# Patient Record
Sex: FEMALE | Race: BLACK OR AFRICAN AMERICAN
[De-identification: names, ages, dates, MRNs, and addresses within clinical notes are randomized per-mention and may not be internally consistent; named-entity substitution may affect disease eponyms.]

---

## 2017-04-24 ENCOUNTER — HOSPITAL ENCOUNTER (EMERGENCY)
Dept: HOSPITAL 41 - JD.ED | Age: 27
Discharge: HOME | End: 2017-04-24
Payer: SELF-PAY

## 2017-04-24 VITALS — SYSTOLIC BLOOD PRESSURE: 137 MMHG | DIASTOLIC BLOOD PRESSURE: 83 MMHG
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
## 2017-04-24 DIAGNOSIS — F10.239: ICD-10-CM

## 2017-04-24 DIAGNOSIS — R56.9: Primary | ICD-10-CM

## 2017-04-24 PROCEDURE — 82009 KETONE BODYS QUAL: CPT

## 2017-04-24 PROCEDURE — 85025 COMPLETE CBC W/AUTO DIFF WBC: CPT

## 2017-04-24 PROCEDURE — 84703 CHORIONIC GONADOTROPIN ASSAY: CPT

## 2017-04-24 PROCEDURE — 84443 ASSAY THYROID STIM HORMONE: CPT

## 2017-04-24 PROCEDURE — G0480 DRUG TEST DEF 1-7 CLASSES: HCPCS

## 2017-04-24 PROCEDURE — 80053 COMPREHEN METABOLIC PANEL: CPT

## 2017-04-24 PROCEDURE — 81001 URINALYSIS AUTO W/SCOPE: CPT

## 2017-04-24 PROCEDURE — 99285 EMERGENCY DEPT VISIT HI MDM: CPT

## 2017-04-24 PROCEDURE — 96361 HYDRATE IV INFUSION ADD-ON: CPT

## 2017-04-24 PROCEDURE — 36415 COLL VENOUS BLD VENIPUNCTURE: CPT

## 2017-04-24 PROCEDURE — 80306 DRUG TEST PRSMV INSTRMNT: CPT

## 2017-04-24 PROCEDURE — 83735 ASSAY OF MAGNESIUM: CPT

## 2017-04-24 PROCEDURE — 96374 THER/PROPH/DIAG INJ IV PUSH: CPT

## 2017-04-24 NOTE — EDM.PDOC
ED HPI SEIZURE COMPLAINT





- General


Chief Complaint: Neurological Problem


Stated Complaint: [facility]


Time Seen by Provider: 17 11:13


Source of Information: Reports: Patient, EMS


History Limitations: Reports: Other (post ictal)





- History of Present Illness


INITIAL COMMENTS - FREE TEXT/NARRATIVE: 





Patient presents for evaluation and treatment of a seizure. Patient was at work 

when the seizure occurred. She is a  at [employer]. This 

was a witnessed seizure. Per the other workers reports the seizure was 5 

minutes. EMS responded to the call and was there within 3 minutes. Upon EMS 

arrival she was no longer seizing. Patient states she has no history of seizures

, however, EMS did recognize her and has picked her up after a seizure before. 

She is currently orientated to person and place but does not recall the day 

month or year. She does not recall having a seizure. She states last thing she 

remembers was working at [employer]. Per her coworkers she had a generalized 

tonic clonic seizure. She's not on any medications. Has no underlying medical 

conditions. She denies any urinary incontinence, tongue biting, headaches, 

vision changes, lightheadedness or dizziness.





Denies any drug or alcohol abuse. 


Event Occurred (Where): work


Event (Witnessed/Unwitnessed): witnessed


Quality: Reports: generalized shaking


Event Symptoms: Reports: confusion.  Denies: incontinence, tongue biting, 

headaches


Post Event Symptoms: Reports: confused.  Denies: headache





- Related Data


Allergies/ADRs: 


 Allergies











Allergy/AdvReac Type Severity Reaction Status Date / Time


 


No Known Allergies Allergy   Verified 17 11:16











Home Meds: 


 Home Meds





. [No Known Home Meds]  17 [History]











Past Medical History





- Past Health History


Medical/Surgical History: Denies Medical/Surgical History





Social & Family History





- Family History


Family Medical History: Noncontributory





- Tobacco Use


Smoking Status *Q: Never Smoker


Second Hand Smoke Exposure: No





- Recreational Drug Use


Recreational Drug Use: No





ED ROS GENERAL





- Review of Systems


Review Of Systems: See Below


Constitutional: Denies: fever


HEENT: Denies: Vision change


GI/Abdominal: Denies: Nausea, Vomiting


Neurological: Reports: Seizure.  Denies: Headache, Numbness, Tingling





- Physical Exam


Exam: See Below


Exam Limited By: No limitations


General Appearance: alert, WD/WN, no apparent distress


Eye Exam: bilateral eye: PERRL


Ears: normal external exam, normal canal, hearing grossly normal, normal TMs


Nose: normal inspection


Throat/Mouth: Normal inspection, Normal lips, Normal voice, No airway 

compromise.  No: Evidence of tongue biting


Neck: normal inspection, full range of motion


Respiratory/Chest: no respiratory distress, lungs clear, normal breath sounds


Cardiovascular: normal peripheral pulses, regular rate, rhythm, no murmur


GI/Abdominal: normal bowel sounds, soft, non tender


Neuro Exam (Abbreviated): alert, oriented (orientated to person,  and place 

but not day, month or year), confused (orientated  to person, date of brith, 

place but not time)


Psychiatric: normal affect, normal mood


Skin Exam: Warm, Dry, Normal color





Course





- Vital Signs


Last Recorded V/S: 


 Last Vital Signs











Temp  36.4 C   17 11:13


 


Pulse  114 H  17 16:20


 


Resp  25 H  17 16:20


 


BP  137/83   17 16:20


 


Pulse Ox  100   17 16:20














- Orders/Labs/Meds


Labs: 


 Laboratory Tests











  17 Range/Units





  11:35 11:35 11:35 


 


WBC  3.92 L    (3.98-10.04)  K/mm3


 


RBC  3.54 L    (3.98-5.22)  M/mm3


 


Hgb  10.8 L    (11.2-15.7)  gm/L


 


Hct  33.2 L    (34.1-44.9)  %


 


MCV  93.8    (79.4-94.8)  fl


 


MCH  30.5    (25.6-32.2)  pg


 


MCHC  32.5    (32.2-35.5)  g/dl


 


RDW Std Deviation  57.2 H    (36.4-46.3)  fL


 


Plt Count  315    (182-369)  K/mm3


 


MPV  9.0 L    (9.4-12.3)  fl


 


Neut % (Auto)  74.9 H    (34.0-71.1)  %


 


Lymph % (Auto)  16.6 L    (19.3-51.7)  %


 


Mono % (Auto)  6.9    (4.7-12.5)  %


 


Eos % (Auto)  0 L    (0.7-5.8)  


 


Baso % (Auto)  1.3 H    (0.1-1.2)  %


 


Neut # (Auto)  2.94    (1.56-6.13)  K/mm3


 


Lymph # (Auto)  0.65 L    (1.18-3.74)  K/mm3


 


Mono # (Auto)  0.27    (0.24-0.36)  K/mm3


 


Eos # (Auto)  0.00 L    (0.04-0.36)  K/mm3


 


Baso # (Auto)  0.05    (0.01-0.08)  K/mm3


 


Sodium   139   (136-145)  mEq/L


 


Potassium   3.9   (3.5-5.1)  mEq/L


 


Chloride   101   ()  mEq/L


 


Carbon Dioxide   21   (21-32)  mEq/L


 


Anion Gap   20.9 H   (5-15)  


 


BUN   11   (7-18)  mg/dL


 


Creatinine   0.6   (0.55-1.02)  mg/dL


 


Est Cr Clr Drug Dosing   TNP   


 


Estimated GFR (MDRD)   > 60   (>60)  mL/min


 


BUN/Creatinine Ratio   18.3 H   (14-18)  


 


Glucose   138 H   ()  mg/dL


 


Calcium   9.1   (8.5-10.1)  mg/dL


 


Magnesium   1.6 L   (1.8-2.4)  mg/dl


 


Total Bilirubin   0.7   (0.2-1.0)  mg/dL


 


AST   192 H   (15-37)  U/L


 


ALT   100 H   (14-59)  U/L


 


Alkaline Phosphatase   129 H   ()  U/L


 


Total Protein   7.7   (6.4-8.2)  g/dl


 


Albumin   3.7   (3.4-5.0)  g/dl


 


Globulin   4.0   gm/dL


 


Albumin/Globulin Ratio   0.9 L   (1-2)  


 


TSH 3rd Generation   2.805   (0.358-3.74)  uIU/mL


 


HCG, Qual     (NEGATIVE)  


 


Urine Color     (Yellow)  


 


Urine Appearance     (Clear)  


 


Urine pH     (5.0-8.0)  


 


Ur Specific Gravity     (1.005-1.030)  


 


Urine Protein     (Negative)  


 


Urine Glucose (UA)     (Negative)  


 


Urine Ketones     (Negative)  


 


Urine Occult Blood     (Negative)  


 


Urine Nitrite     (Negative)  


 


Urine Bilirubin     (Negative)  


 


Urine Urobilinogen     (0.2-1.0)  


 


Ur Leukocyte Esterase     (Negative)  


 


Urine RBC     (0-5)  /hpf


 


Urine WBC     (0-5)  /hpf


 


Ur Epithelial Cells     (0-5)  /hpf


 


Urine Bacteria     (FEW)  /hpf


 


Hyaline Casts     (0-5)  /lpf


 


Urine Mucus     (FEW)  /hpf


 


Urine Opiates Screen     (NEGATIVE)  


 


Ur Buprenorphine Scrn     (NEGATIVE)  


 


Ur Oxycodone Screen     (NEGATIVE)  


 


Urine Methadone Screen     (NEGATIVE)  


 


Ur Propoxyphene Screen     (NEGATIVE)  


 


Ur Barbiturates Screen     (NEGATIVE)  


 


Ur Tricyclics Screen     (NEGATIVE)  


 


Ur Phencyclidine Scrn     (NEGATIVE)  


 


Ur Amphetamine Screen     (NEGATIVE)  


 


U Methamphetamines Scrn     (NEGATIVE)  


 


U Benzodiazepines Scrn     (NEGATIVE)  


 


U Cocaine Metab Screen     (NEGATIVE)  


 


U Marijuana (THC) Screen     (NEGATIVE)  


 


Ethyl Alcohol   0.01   (0.00)  gm%


 


Ketones    0.47  (0.0-0.3)  mM














  17 Range/Units





  11:35 14:45 14:45 


 


WBC     (3.98-10.04)  K/mm3


 


RBC     (3.98-5.22)  M/mm3


 


Hgb     (11.2-15.7)  gm/L


 


Hct     (34.1-44.9)  %


 


MCV     (79.4-94.8)  fl


 


MCH     (25.6-32.2)  pg


 


MCHC     (32.2-35.5)  g/dl


 


RDW Std Deviation     (36.4-46.3)  fL


 


Plt Count     (182-369)  K/mm3


 


MPV     (9.4-12.3)  fl


 


Neut % (Auto)     (34.0-71.1)  %


 


Lymph % (Auto)     (19.3-51.7)  %


 


Mono % (Auto)     (4.7-12.5)  %


 


Eos % (Auto)     (0.7-5.8)  


 


Baso % (Auto)     (0.1-1.2)  %


 


Neut # (Auto)     (1.56-6.13)  K/mm3


 


Lymph # (Auto)     (1.18-3.74)  K/mm3


 


Mono # (Auto)     (0.24-0.36)  K/mm3


 


Eos # (Auto)     (0.04-0.36)  K/mm3


 


Baso # (Auto)     (0.01-0.08)  K/mm3


 


Sodium     (136-145)  mEq/L


 


Potassium     (3.5-5.1)  mEq/L


 


Chloride     ()  mEq/L


 


Carbon Dioxide     (21-32)  mEq/L


 


Anion Gap     (5-15)  


 


BUN     (7-18)  mg/dL


 


Creatinine     (0.55-1.02)  mg/dL


 


Est Cr Clr Drug Dosing     


 


Estimated GFR (MDRD)     (>60)  mL/min


 


BUN/Creatinine Ratio     (14-18)  


 


Glucose     ()  mg/dL


 


Calcium     (8.5-10.1)  mg/dL


 


Magnesium     (1.8-2.4)  mg/dl


 


Total Bilirubin     (0.2-1.0)  mg/dL


 


AST     (15-37)  U/L


 


ALT     (14-59)  U/L


 


Alkaline Phosphatase     ()  U/L


 


Total Protein     (6.4-8.2)  g/dl


 


Albumin     (3.4-5.0)  g/dl


 


Globulin     gm/dL


 


Albumin/Globulin Ratio     (1-2)  


 


TSH 3rd Generation     (0.358-3.74)  uIU/mL


 


HCG, Qual  Negative    (NEGATIVE)  


 


Urine Color    Yellow  (Yellow)  


 


Urine Appearance    Slt cloudy H  (Clear)  


 


Urine pH    7.0  (5.0-8.0)  


 


Ur Specific Gravity    1.025  (1.005-1.030)  


 


Urine Protein    1+ H  (Negative)  


 


Urine Glucose (UA)    Negative  (Negative)  


 


Urine Ketones    2+ H  (Negative)  


 


Urine Occult Blood    Negative  (Negative)  


 


Urine Nitrite    Negative  (Negative)  


 


Urine Bilirubin    Negative  (Negative)  


 


Urine Urobilinogen    1.0  (0.2-1.0)  


 


Ur Leukocyte Esterase    Negative  (Negative)  


 


Urine RBC    0-5  (0-5)  /hpf


 


Urine WBC    0-5  (0-5)  /hpf


 


Ur Epithelial Cells    0-5  (0-5)  /hpf


 


Urine Bacteria    Rare  (FEW)  /hpf


 


Hyaline Casts    0-5  (0-5)  /lpf


 


Urine Mucus    Few  (FEW)  /hpf


 


Urine Opiates Screen   Negative   (NEGATIVE)  


 


Ur Buprenorphine Scrn   Negative   (NEGATIVE)  


 


Ur Oxycodone Screen   Negative   (NEGATIVE)  


 


Urine Methadone Screen   Negative   (NEGATIVE)  


 


Ur Propoxyphene Screen   Negative   (NEGATIVE)  


 


Ur Barbiturates Screen   Negative   (NEGATIVE)  


 


Ur Tricyclics Screen   Negative   (NEGATIVE)  


 


Ur Phencyclidine Scrn   Negative   (NEGATIVE)  


 


Ur Amphetamine Screen   Negative   (NEGATIVE)  


 


U Methamphetamines Scrn   Negative   (NEGATIVE)  


 


U Benzodiazepines Scrn   Negative   (NEGATIVE)  


 


U Cocaine Metab Screen   Negative   (NEGATIVE)  


 


U Marijuana (THC) Screen   Negative   (NEGATIVE)  


 


Ethyl Alcohol     (0.00)  gm%


 


Ketones     (0.0-0.3)  mM











Meds: 


Medications














Discontinued Medications














Generic Name Dose Route Start Last Admin





  Trade Name Melisa  PRN Reason Stop Dose Admin


 


Sodium Chloride  1,000 mls @ 999 mls/hr  17 11:20  17 11:40





  Normal Saline  IV  17 12:20  999 mls/hr





  ONETIME ONE   Administration


 


Sodium Chloride  500 mls @ 999 mls/hr  17 14:03  17 14:05





  Normal Saline  IV  17 14:33  999 mls/hr





  ONETIME ONE   Administration


 


Sodium Chloride  Confirm  17 14:05  17 14:08





  Normal Saline  Administered  17 14:06  Not Given





  Dose   





  1,000 mls @ as directed   





  .ROUTE   





  .STK-MED ONE   


 


Lorazepam  1 mg  17 11:20  17 11:38





  Ativan  IVPUSH  17 11:21  1 mg





  ONETIME ONE   Administration


 


Sodium Chloride  10 ml  17 11:16  17 11:35





  Saline Flush  FLUSH   10 ml





  ASDIRECTED PRN   Administration





  Keep Vein Open   














- Re-Assessments/Exams


Free Text/Narrative Re-Assessment/Exam: 





17 13:21


Patient is currently resting comfortably. She is hungry. I will have the 

nursing staff get her something to eat. I asked her about alcohol use. She 

states that she did drink on Friday and Saturday. She states that they "went 

crazy ". She denies anything to drink on . She states that she drinks 

socially on occasion. She is not a heavy drinker. She is still denying any pain 

or headaches.





Review of the patient's records show that she was in the ER in  

after having a seizure. A CT of the head was done at that time. Therefore I 

will defer CT as she had one recently. She has a small bump to her for her but 

no signs of any head bleed. She continues to deny any headaches. Alcohol was 

slightly elevated at her 2016 visit. Record also shows an admission to 

the ICU in 2014 with alcohol of 0.51.





17 16:00


Patient's labs have returned.


White blood cell count is 3.92, hemoglobin of 10.8 and platelets are 3:15.


HCG is negative.


Sodium is 139, potassium 3.9 and chloride is 101. Anion gap is 20.9. Glucose is 

138.


mag is slightly low 1.6.


AST is 192, ALT is 100 alkaline phosphatase is 129.


Alcohol is 0.01.


TSH is 2.805.


Ketones are 0.85.





Discussed case with Dr. Curiel. Recommended starting keppra as this is the 

second seizure she has had.





Discussed starting keppra with the patient. She is not interested in starting 

keppra. I feel that this is likely an alcohol withdrawal seizure. I advised 

that she should not drink alcohol. I feel that she would benefit from starting 

Keppra as I am unsure if she will stop drinking alcohol. She is not interested 

in starting Keppra.





Patient does not have insurance and she is concerned about cost. I will have 

her contact Candice at the hospital to discussed payments and insurance needs.





Will discharge home. Discharge instructions as documented. 








Departure





- Departure


Time of Disposition: 16:00


Disposition: Home, Self-Care 01


Condition: fair


Clinical Impression: 


 Seizure, Alcohol withdrawal





Instructions:  Alcohol Use Disorder, Seizure, Adult


Referrals: 


PCP,None [Primary Care Provider] - 


Pam Garcia PA-C [Physician Assistant] - 


Forms:  ED Department Discharge


Additional Instructions: 


Go home and rest. 





Make sure you are drinking plenty of fluids today. 





Recommend that you do not drive x 3 months due to recent seizure. 





Follow-up with family med or internal med in 1-2 weeks for a recheck.





Avoid alcohol. 





For insurance and financial help I recommend you contact Candice Warren at [phone]. 





Please return to the ER should your symptoms change or worsen.

## 2020-03-09 ENCOUNTER — HOSPITAL ENCOUNTER (EMERGENCY)
Dept: HOSPITAL 41 - JD.ED | Age: 30
Discharge: HOME | End: 2020-03-09
Payer: SELF-PAY

## 2020-03-09 VITALS — SYSTOLIC BLOOD PRESSURE: 143 MMHG | HEART RATE: 94 BPM | DIASTOLIC BLOOD PRESSURE: 87 MMHG

## 2020-03-09 DIAGNOSIS — G40.409: Primary | ICD-10-CM

## 2020-03-09 NOTE — CT
Head CT

 

Technique: Multiple axial sections through the brain were obtained.  

Intravenous contrast was not utilized.

 

Comparison: Prior head CT study of 10/11/14.

 

Findings: Ventricles along with basal cisterns and sulci over the 

convexities are within normal limits for the patient's age.  No 

abnormal parenchymal densities are seen.  No evidence of intracranial 

hemorrhage.  No midline shift or mass-effect is seen.  

 

Bone window settings were reviewed show mastoid sinuses to appear 

clear.  Opacified left maxillary sinus is seen as well as mild mucosal

 thickening within the left ethmoid sinus. No acute calvarial 

abnormality is appreciated.

 

Impression:

1.  Opacified left maxillary sinus and mucosal thickening within the 

left ethmoid sinus.  Uncertain if findings represent acute or chronic 

sinusitis.

2.  No acute intracranial abnormality is identified.

 

Diagnostic code #3

 

This report was dictated in [place] Standard Time

## 2020-06-17 ENCOUNTER — HOSPITAL ENCOUNTER (EMERGENCY)
Dept: HOSPITAL 41 - JD.ED | Age: 30
Discharge: HOME | End: 2020-06-17
Payer: SELF-PAY

## 2020-06-17 VITALS — SYSTOLIC BLOOD PRESSURE: 132 MMHG | HEART RATE: 102 BPM | DIASTOLIC BLOOD PRESSURE: 91 MMHG

## 2020-06-17 DIAGNOSIS — R56.9: Primary | ICD-10-CM

## 2020-06-17 DIAGNOSIS — F10.230: ICD-10-CM

## 2020-06-17 PROCEDURE — 96361 HYDRATE IV INFUSION ADD-ON: CPT

## 2020-06-17 PROCEDURE — 80053 COMPREHEN METABOLIC PANEL: CPT

## 2020-06-17 PROCEDURE — 84703 CHORIONIC GONADOTROPIN ASSAY: CPT

## 2020-06-17 PROCEDURE — 99285 EMERGENCY DEPT VISIT HI MDM: CPT

## 2020-06-17 PROCEDURE — 80307 DRUG TEST PRSMV CHEM ANLYZR: CPT

## 2020-06-17 PROCEDURE — 70450 CT HEAD/BRAIN W/O DYE: CPT

## 2020-06-17 PROCEDURE — 96374 THER/PROPH/DIAG INJ IV PUSH: CPT

## 2020-06-17 PROCEDURE — 83735 ASSAY OF MAGNESIUM: CPT

## 2020-06-17 PROCEDURE — 36415 COLL VENOUS BLD VENIPUNCTURE: CPT

## 2020-06-17 PROCEDURE — 85025 COMPLETE CBC W/AUTO DIFF WBC: CPT

## 2020-06-17 NOTE — EDM.PDOC
ED HPI GENERAL MEDICAL PROBLEM





- General


Chief Complaint: Neuro Symptoms/Deficits


Stated Complaint: [facility]


Time Seen by Provider: 06/17/20 11:00


Source of Information: Reports: Patient, EMS


History Limitations: Reports: Altered Mental Status





- History of Present Illness


INITIAL COMMENTS - FREE TEXT/NARRATIVE: 





The patient presents by [facility] for a seizure.  The patient was at 

the [facility] and had a generalized tonic clonic seizure.  She did hit her head when 

she fell.  She is still a little confused to where she is and the date.  She has

no headache, fever, chills, cough, chest pain, shortness of breath, abdominal 

pain, nausea or vomiting.  She denies that this has happened before but her ER 

records indicate she has been here a few times for the same thing.  She was put 

on keppra at one time but she did not continue to take it.  She also one time 

was intoxicated and needed to be admitted.  She says she occasionally drinks now

and did not drink recently.


Onset: Sudden


Duration: Minutes:


Location: Reports: Generalized


Severity: Severe


Improves with: Reports: None


Worsens with: Reports: None


Associated Symptoms: Reports: No Other Symptoms





- Related Data


                                    Allergies











Allergy/AdvReac Type Severity Reaction Status Date / Time


 


No Known Allergies Allergy   Verified 06/17/20 11:01











Home Meds: 


                                    Home Meds





LORazepam [Ativan] 1 mg PO DAILY #18 tablet 06/17/20 [Rx]











Past Medical History





- Past Health History


Medical/Surgical History: Denies Medical/Surgical History





Social & Family History





- Family History


Family Medical History: Noncontributory





- Tobacco Use


Smoking Status *Q: Never Smoker


Second Hand Smoke Exposure: No





- Caffeine Use


Caffeine Use: Reports: None





- Recreational Drug Use


Recreational Drug Use: No





ED ROS GENERAL





- Review of Systems


Review Of Systems: See Below


Constitutional: Reports: No Symptoms


HEENT: Reports: No Symptoms


Respiratory: Reports: No Symptoms


Cardiovascular: Reports: No Symptoms


Endocrine: Reports: No Symptoms


GI/Abdominal: Reports: No Symptoms


: Reports: No Symptoms


Musculoskeletal: Reports: No Symptoms


Neurological: Reports: Seizure





ED EXAM, NEURO





- Physical Exam


Exam: See Below


Exam Limited By: No Limitations


General Appearance: Alert, No Apparent Distress


Ears: Normal External Exam


Nose: Normal Inspection


Head Exam: Atraumatic, Normocephalic


Neck: Normal Inspection


Respiratory/Chest: No Respiratory Distress, Lungs Clear, Normal Breath Sounds


Cardiovascular: Regular Rate, Rhythm, No Edema, No Murmur


GI/Abdominal: Soft, Non-Tender, No Organomegaly, No Mass


Neurological: Alert, No Motor/Sensory Deficits, Other (She is orientated to 

person but she is confused on where she is and the day of the week.)





Course





- Vital Signs


Last Recorded V/S: 


                                Last Vital Signs











Temp  98.3 F   06/17/20 10:55


 


Pulse  128 H  06/17/20 10:55


 


Resp  17   06/17/20 10:55


 


BP  122/82   06/17/20 10:55


 


Pulse Ox  100   06/17/20 10:55














- Orders/Labs/Meds


Orders: 


                               Active Orders 24 hr











 Category Date Time Status


 


 Cardiac Monitoring [RC] .AS DIRECTED Care  06/17/20 11:08 Active


 


 Peripheral IV Care [RC] .AS DIRECTED Care  06/17/20 11:09 Active


 


 Sodium Chloride 0.9% [Normal Saline] 1,000 ml Med  06/17/20 11:15 Active





 IV .BOLUS   


 


 Sodium Chloride 0.9% [Saline Flush] Med  06/17/20 11:08 Active





 10 ml FLUSH ASDIRECTED PRN   


 


 Peripheral IV Insertion Adult [OM.PC] Stat Oth  06/17/20 11:08 Ordered








                                Medication Orders





Sodium Chloride (Normal Saline)  1,000 mls @ 1,000 mls/hr IV .BOLUS ZULLY


   Last Admin: 06/17/20 11:37  Dose: 1,000 mls/hr


   Documented by: EMMETT


Sodium Chloride (Saline Flush)  10 ml FLUSH ASDIRECTED PRN


   PRN Reason: Keep Vein Open


   Last Admin: 06/17/20 11:30  Dose: 10 ml


   Documented by: EMMETT








Labs: 


                                Laboratory Tests











  06/17/20 06/17/20 06/17/20 Range/Units





  11:30 11:30 11:30 


 


WBC  7.82    (3.98-10.04)  K/mm3


 


RBC  3.77 L    (3.98-5.22)  M/mm3


 


Hgb  10.9 L    (11.2-15.7)  gm/dl


 


Hct  35.0    (34.1-44.9)  %


 


MCV  92.8  D    (79.4-94.8)  fl


 


MCH  28.9    (25.6-32.2)  pg


 


MCHC  31.1 L    (32.2-35.5)  g/dl


 


RDW Std Deviation  70.9 H    (36.4-46.3)  fL


 


Plt Count  204    (182-369)  K/mm3


 


MPV  9.0 L    (9.4-12.3)  fl


 


Neut % (Auto)  88.9 H    (34.0-71.1)  %


 


Lymph % (Auto)  6.1 L    (19.3-51.7)  %


 


Mono % (Auto)  4.5 L    (4.7-12.5)  %


 


Eos % (Auto)  0 L    (0.7-5.8)  


 


Baso % (Auto)  0.4    (0.1-1.2)  %


 


Neut # (Auto)  6.95 H    (1.56-6.13)  K/mm3


 


Lymph # (Auto)  0.48 L    (1.18-3.74)  K/mm3


 


Mono # (Auto)  0.35    (0.24-0.36)  K/mm3


 


Eos # (Auto)  0.00 L    (0.04-0.36)  K/mm3


 


Baso # (Auto)  0.03    (0.01-0.08)  K/mm3


 


Manual Slide Review  Abnormal smear    


 


Sodium   137   (136-145)  mEq/L


 


Potassium   3.9   (3.5-5.1)  mEq/L


 


Chloride   99   ()  mEq/L


 


Carbon Dioxide   18 L   (21-32)  mEq/L


 


Anion Gap   23.9 H   (5-15)  


 


BUN   6 L   (7-18)  mg/dL


 


Creatinine   1.0   (0.55-1.02)  mg/dL


 


Est Cr Clr Drug Dosing   68.67   mL/min


 


Estimated GFR (MDRD)   > 60   (>60)  mL/min


 


BUN/Creatinine Ratio   6.0 L   (14-18)  


 


Glucose   114 H   ()  mg/dL


 


Calcium   9.7   (8.5-10.1)  mg/dL


 


Magnesium   1.8   (1.8-2.4)  mg/dl


 


Total Bilirubin   1.2 H   (0.2-1.0)  mg/dL


 


AST   316 H   (15-37)  U/L


 


ALT   132 H   (14-59)  U/L


 


Alkaline Phosphatase   271 H   ()  U/L


 


Total Protein   9.3 H   (6.4-8.2)  g/dl


 


Albumin   3.6   (3.4-5.0)  g/dl


 


Globulin   5.7   gm/dL


 


Albumin/Globulin Ratio   0.6 L   (1-2)  


 


HCG, Qual    Negative  (NEGATIVE)  


 


Ethyl Alcohol   0.00   (0.00)  gm%











Meds: 


Medications











Generic Name Dose Route Start Last Admin





  Trade Name Frechristiano  PRN Reason Stop Dose Admin


 


Sodium Chloride  1,000 mls @ 1,000 mls/hr  06/17/20 11:15  06/17/20 11:37





  Normal Saline  IV   1,000 mls/hr





  .BOLUS ZULLY   Administration


 


Sodium Chloride  10 ml  06/17/20 11:08  06/17/20 11:30





  Saline Flush  FLUSH   10 ml





  ASDIRECTED PRN   Administration





  Keep Vein Open  














Discontinued Medications














Generic Name Dose Route Start Last Admin





  Trade Name Freq  PRN Reason Stop Dose Admin


 


Lorazepam  0.5 mg  06/17/20 11:10  06/17/20 11:35





  Ativan  IVPUSH  06/17/20 11:11  0.5 mg





  ONETIME ONE   Administration














- Re-Assessments/Exams


Free Text/Narrative Re-Assessment/Exam: 





06/17/20 11:56


I ordered an IV NS 1L bolus, ativan 0.5mg IV, CT of her head and labs.


06/17/20 13:15


The CT of her head shows nothing acute.  Her Hgb is a little low at 10.9.  Her 

anion gap is 23.9.  Her total bili is 1.2.  Her AST is elevated at 316.  Her ALt

 is elevated at 132.  Her alk phos is elevated at 271.  Her HCG negative along 

with her ETOH.  





I feel she is withdrawing from alcohol.  She does admit to drink ing heavily 

through the weekend and Monday.





Departure





- Departure


Time of Disposition: 13:40


Disposition: Home, Self-Care 01


Condition: Good


Clinical Impression: 


 Seizure





Alcohol withdrawal


Qualifiers:


 Complication of substance-induced condition: uncomplicated Qualified Code(s): 

F10.230 - Alcohol dependence with withdrawal, uncomplicated








- Discharge Information


*PRESCRIPTION DRUG MONITORING PROGRAM REVIEWED*: No


*COPY OF PRESCRIPTION DRUG MONITORING REPORT IN PATIENT TALIA: No


Prescriptions: 


LORazepam [Ativan] 1 mg PO DAILY #18 tablet


Referrals: 


Angela Coleman, NP [Nurse Practitioner] - 1 Week


Forms:  ED Department Discharge


Additional Instructions: 


Drink plenty of fluids.  Avoid alcohol.  Take the ativan as prescribed for the 

withdrawal symptoms.  Please return if you are worse.





Sepsis Event Note (ED)





- Evaluation


Sepsis Screening Result: No Definite Risk





- Focused Exam


Vital Signs: 


                                   Vital Signs











  Temp Pulse Resp BP Pulse Ox


 


 06/17/20 10:55  98.3 F  128 H  17  122/82  100














- My Orders


Last 24 Hours: 


My Active Orders





06/17/20 11:08


Cardiac Monitoring [RC] .AS DIRECTED 


Sodium Chloride 0.9% [Saline Flush]   10 ml FLUSH ASDIRECTED PRN 


Peripheral IV Insertion Adult [OM.PC] Stat 





06/17/20 11:09


Peripheral IV Care [RC] .AS DIRECTED 





06/17/20 11:15


Sodium Chloride 0.9% [Normal Saline] 1,000 ml IV .BOLUS 














- Assessment/Plan


Last 24 Hours: 


My Active Orders





06/17/20 11:08


Cardiac Monitoring [RC] .AS DIRECTED 


Sodium Chloride 0.9% [Saline Flush]   10 ml FLUSH ASDIRECTED PRN 


Peripheral IV Insertion Adult [OM.PC] Stat 





06/17/20 11:09


Peripheral IV Care [RC] .AS DIRECTED 





06/17/20 11:15


Sodium Chloride 0.9% [Normal Saline] 1,000 ml IV .BOLUS

## 2020-06-17 NOTE — CT
Head CT

 

Technique: Multiple axial sections through the brain were obtained.  

Intravenous contrast was not utilized.

 

Comparison: Prior head CT study of 03/09/20.

 

Findings: Ventricles along with basal cisterns and sulci over the 

convexities are stable.  No abnormal parenchymal densities are seen.  

No evidence of intracranial hemorrhage.  No midline shift or mass 

effect is seen.

 

Opacified left maxillary sinus is noted with mucosal thickening seen 

within the left ethmoid sinuses.  These findings are stable from prior

 CT exam.  Mastoid sinuses show nothing acute.  No acute calvarial 

abnormality is seen.

 

Impression:

1.  Opacified left maxillary sinus as well as mucosal thickening 

within left ethmoid sinus.  These findings are stable from prior head 

CT exam.

2.  No acute intracranial abnormality is appreciated.

 

Diagnostic code #2

 

This report was dictated in MDT

## 2021-08-31 ENCOUNTER — HOSPITAL ENCOUNTER (EMERGENCY)
Dept: HOSPITAL 41 - JD.ED | Age: 31
Discharge: HOME | End: 2021-08-31
Payer: MEDICAID

## 2021-08-31 VITALS — HEART RATE: 129 BPM | DIASTOLIC BLOOD PRESSURE: 121 MMHG | SYSTOLIC BLOOD PRESSURE: 149 MMHG

## 2021-08-31 DIAGNOSIS — R56.9: Primary | ICD-10-CM
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
## 2021-08-31 DIAGNOSIS — F10.230: ICD-10-CM

## 2021-08-31 PROCEDURE — 80053 COMPREHEN METABOLIC PANEL: CPT

## 2021-08-31 PROCEDURE — 80307 DRUG TEST PRSMV CHEM ANLYZR: CPT

## 2021-08-31 PROCEDURE — 96374 THER/PROPH/DIAG INJ IV PUSH: CPT

## 2021-08-31 PROCEDURE — 99285 EMERGENCY DEPT VISIT HI MDM: CPT

## 2021-08-31 PROCEDURE — 84703 CHORIONIC GONADOTROPIN ASSAY: CPT

## 2021-08-31 PROCEDURE — 85025 COMPLETE CBC W/AUTO DIFF WBC: CPT

## 2021-08-31 PROCEDURE — 36415 COLL VENOUS BLD VENIPUNCTURE: CPT

## 2021-08-31 PROCEDURE — 83735 ASSAY OF MAGNESIUM: CPT

## 2021-08-31 RX ADMIN — Medication PRN ML: at 15:44

## 2021-08-31 RX ADMIN — LORAZEPAM ONE MG: 2 INJECTION INTRAMUSCULAR; INTRAVENOUS at 15:44

## 2021-08-31 NOTE — EDM.PDOC
ED HPI GENERAL MEDICAL PROBLEM





- General


Chief Complaint: Neurological Problem


Stated Complaint: [facility]


Time Seen by Provider: 08/31/21 15:18


Source of Information: Reports: Patient, EMS


History Limitations: Reports: Altered Mental Status





- History of Present Illness


INITIAL COMMENTS - FREE TEXT/NARRATIVE: 





The patient presents by [facility] for a seizure.  She was at work at 

[employer] and she had a generalized tonic clonic seizure lasting a few 

minutes.  She was post ictal and confused when they arrived.  She was still post

ictal when she arrived here.  She has a history of seizures and she sees Pam Garcia in our clinic.  She also has seen Dr Sibley a neurologist in [place].  She

is on keppra 2 times per day.  She has been taking her medications as 

prescribed.  She has not been sleeping very well.  She denies drinking alcohol. 

She has no fever, chills, cough, chest pain, shortness of breath, or diarrhea.  

She does have some nausea and vomiting.


Onset: Sudden


Duration: Minutes:


Location: Reports: Generalized


Severity: Moderate


Improves with: Reports: None


Worsens with: Reports: None


Associated Symptoms: Reports: Nausea/Vomiting, Seizure.  Denies: Chest Pain, Cou

gh, Fever/Chills, Headaches, Shortness of Breath





- Related Data


                                    Allergies











Allergy/AdvReac Type Severity Reaction Status Date / Time


 


No Known Allergies Allergy   Verified 08/31/21 15:23











Home Meds: 


                                    Home Meds





LORazepam [Ativan] 1 mg PO DAILY #18 tablet 06/17/20 [Rx]


Ondansetron [Zofran ODT] 4 mg PO Q6H PRN #20 tab.dis 06/17/20 [Rx]


LORazepam [Ativan] 1 mg PO DAILY #18 tablet 08/31/21 [Rx]


Ondansetron [Zofran ODT] 4 mg PO Q6H PRN #20 tab.dis 08/31/21 [Rx]











Past Medical History





- Past Health History


Medical/Surgical History: Denies Medical/Surgical History





Social & Family History





- Family History


Family Medical History: No Pertinent Family History





- Caffeine Use


Caffeine Use: Reports: None





ED ROS GENERAL





- Review of Systems


Review Of Systems: See Below


Constitutional: Reports: No Symptoms


HEENT: Reports: No Symptoms


Respiratory: Reports: No Symptoms


Cardiovascular: Reports: No Symptoms


Endocrine: Reports: No Symptoms


GI/Abdominal: Reports: Nausea, Vomiting.  Denies: Abdominal Pain, Diarrhea


: Reports: No Symptoms


Musculoskeletal: Reports: No Symptoms





- Physical Exam


Exam: See Below


Exam Limited By: No Limitations


General Appearance: Alert, No Apparent Distress


Ears: Normal External Exam


Nose: Normal Inspection


Throat/Mouth: Normal Inspection


Head Exam: Atraumatic, Normocephalic


Neck: Normal Inspection, Supple, Non-Tender


Respiratory/Chest: No Respiratory Distress, Lungs Clear, Normal Breath Sounds


Cardiovascular: Regular Rate, Rhythm, No Edema, No Murmur


GI/Abdominal: Soft, Non-Tender, No Organomegaly, No Mass


Neuro Exam (Abbreviated): Alert, No Motor/Sensory Deficits, Other (slightly 

cnofused)





Course





- Vital Signs


Last Recorded V/S: 


                                Last Vital Signs











Temp  97 F   08/31/21 15:20


 


Pulse  129 H  08/31/21 15:20


 


Resp  16   08/31/21 15:20


 


BP  149/121 H  08/31/21 15:20


 


Pulse Ox  100   08/31/21 15:20














- Orders/Labs/Meds


Orders: 


                               Active Orders 24 hr











 Category Date Time Status


 


 Cardiac Monitoring [RC] .AS DIRECTED Care  08/31/21 15:26 Active


 


 Peripheral IV Care [RC] .AS DIRECTED Care  08/31/21 15:26 Active


 


 LORazepam [Ativan] Med  08/31/21 17:20 Once





 1 mg IVPUSH ONETIME ONE   


 


 Sodium Chloride 0.9% [Normal Saline] 1,000 ml Med  08/31/21 15:30 Active





 IV .BOLUS   


 


 Sodium Chloride 0.9% [Saline Flush] Med  08/31/21 15:25 Active





 10 ml FLUSH ASDIRECTED PRN   


 


 Peripheral IV Insertion Adult [OM.PC] Stat Oth  08/31/21 15:25 Ordered








                                Medication Orders





Sodium Chloride (Normal Saline)  1,000 mls @ 1,000 mls/hr IV .BOLUS ZULLY


   Last Admin: 08/31/21 15:44  Dose: 1,000 mls/hr


   Documented by: WICHO


Sodium Chloride (Sodium Chloride 0.9% 10 Ml Syringe)  10 ml FLUSH ASDIRECTED PRN


   PRN Reason: Keep Vein Open


   Last Admin: 08/31/21 15:44  Dose: 10 ml


   Documented by: WICHO








Labs: 


                                Laboratory Tests











  08/31/21 08/31/21 08/31/21 Range/Units





  15:20 15:20 15:20 


 


WBC  7.44    (3.98-10.04)  K/mm3


 


RBC  3.58 L    (3.98-5.22)  M/mm3


 


Hgb  9.4 L    (11.2-15.7)  gm/dl


 


Hct  31.1 L    (34.1-44.9)  %


 


MCV  86.9    (79.4-94.8)  fl


 


MCH  26.3    (25.6-32.2)  pg


 


MCHC  30.2 L    (32.2-35.5)  g/dl


 


RDW Std Deviation  82.3 H    (36.4-46.3)  fL


 


Plt Count  439 H D    (182-369)  K/mm3


 


MPV  8.5 L    (9.4-12.3)  fl


 


Neut % (Auto)  84.8 H    (34.0-71.1)  %


 


Lymph % (Auto)  8.2 L    (19.3-51.7)  %


 


Mono % (Auto)  4.4 L    (4.7-12.5)  %


 


Eos % (Auto)  0 L    (0.7-5.8)  


 


Baso % (Auto)  2.2 H    (0.1-1.2)  %


 


Neut # (Auto)  6.31 H    (1.56-6.13)  K/mm3


 


Lymph # (Auto)  0.61 L    (1.18-3.74)  K/mm3


 


Mono # (Auto)  0.33    (0.24-0.36)  K/mm3


 


Eos # (Auto)  0.00 L    (0.04-0.36)  K/mm3


 


Baso # (Auto)  0.16 H    (0.01-0.08)  K/mm3


 


Sodium   145  D   (136-145)  mEq/L


 


Potassium   4.0   (3.5-5.1)  mEq/L


 


Chloride   102   ()  mEq/L


 


Carbon Dioxide   11 L D   (21-32)  mEq/L


 


Anion Gap   36.0 H   (5-15)  


 


BUN   6 L   (7-18)  mg/dL


 


Creatinine   0.7   (0.55-1.02)  mg/dL


 


Est Cr Clr Drug Dosing   TNP   


 


Estimated GFR (MDRD)   > 60   (>60)  mL/min


 


BUN/Creatinine Ratio   8.6 L   (14-18)  


 


Glucose   83   (70-99)  mg/dL


 


Calcium   8.3 L   (8.5-10.1)  mg/dL


 


Magnesium   1.7 L   (1.8-2.4)  mg/dL


 


Total Bilirubin   0.6   (0.2-1.0)  mg/dL


 


AST   266 H   (15-37)  U/L


 


ALT   83 H   (14-59)  U/L


 


Alkaline Phosphatase   213 H   ()  U/L


 


Total Protein   8.9 H   (6.4-8.2)  g/dl


 


Albumin   3.8   (3.4-5.0)  g/dl


 


Globulin   5.1   gm/dL


 


Albumin/Globulin Ratio   0.8 L   (1-2)  


 


HCG, Qual    Negative  (NEGATIVE)  


 


Ethyl Alcohol     (0.00)  gm%














  08/31/21 Range/Units





  16:06 


 


WBC   (3.98-10.04)  K/mm3


 


RBC   (3.98-5.22)  M/mm3


 


Hgb   (11.2-15.7)  gm/dl


 


Hct   (34.1-44.9)  %


 


MCV   (79.4-94.8)  fl


 


MCH   (25.6-32.2)  pg


 


MCHC   (32.2-35.5)  g/dl


 


RDW Std Deviation   (36.4-46.3)  fL


 


Plt Count   (182-369)  K/mm3


 


MPV   (9.4-12.3)  fl


 


Neut % (Auto)   (34.0-71.1)  %


 


Lymph % (Auto)   (19.3-51.7)  %


 


Mono % (Auto)   (4.7-12.5)  %


 


Eos % (Auto)   (0.7-5.8)  


 


Baso % (Auto)   (0.1-1.2)  %


 


Neut # (Auto)   (1.56-6.13)  K/mm3


 


Lymph # (Auto)   (1.18-3.74)  K/mm3


 


Mono # (Auto)   (0.24-0.36)  K/mm3


 


Eos # (Auto)   (0.04-0.36)  K/mm3


 


Baso # (Auto)   (0.01-0.08)  K/mm3


 


Sodium   (136-145)  mEq/L


 


Potassium   (3.5-5.1)  mEq/L


 


Chloride   ()  mEq/L


 


Carbon Dioxide   (21-32)  mEq/L


 


Anion Gap   (5-15)  


 


BUN   (7-18)  mg/dL


 


Creatinine   (0.55-1.02)  mg/dL


 


Est Cr Clr Drug Dosing   


 


Estimated GFR (MDRD)   (>60)  mL/min


 


BUN/Creatinine Ratio   (14-18)  


 


Glucose   (70-99)  mg/dL


 


Calcium   (8.5-10.1)  mg/dL


 


Magnesium   (1.8-2.4)  mg/dL


 


Total Bilirubin   (0.2-1.0)  mg/dL


 


AST   (15-37)  U/L


 


ALT   (14-59)  U/L


 


Alkaline Phosphatase   ()  U/L


 


Total Protein   (6.4-8.2)  g/dl


 


Albumin   (3.4-5.0)  g/dl


 


Globulin   gm/dL


 


Albumin/Globulin Ratio   (1-2)  


 


HCG, Qual   (NEGATIVE)  


 


Ethyl Alcohol  0.05  (0.00)  gm%











Meds: 


Medications











Generic Name Dose Route Start Last Admin





  Trade Name Freq  PRN Reason Stop Dose Admin


 


Sodium Chloride  1,000 mls @ 1,000 mls/hr  08/31/21 15:30  08/31/21 15:44





  Normal Saline  IV   1,000 mls/hr





  .BOLUS ZULLY   Administration


 


Sodium Chloride  10 ml  08/31/21 15:25  08/31/21 15:44





  Sodium Chloride 0.9% 10 Ml Syringe  FLUSH   10 ml





  ASDIRECTED PRN   Administration





  Keep Vein Open  














Discontinued Medications














Generic Name Dose Route Start Last Admin





  Trade Name Freq  PRN Reason Stop Dose Admin


 


Lorazepam  1 mg  08/31/21 15:27  08/31/21 15:44





  Lorazepam 2 Mg/Ml Sdv  IVPUSH  08/31/21 15:28  1 mg





  ONETIME ONE   Administration














- Re-Assessments/Exams


Free Text/Narrative Re-Assessment/Exam: 





08/31/21 16:43


I ordered an IV NS 1L bolus, ativan 1mg IV, and labs.


08/31/21 16:48


Her Hgb was low at 9.4.  Her platelets were elevated at 439.  Her anion gap is 

elevated at 36.  Her AST is 266.  Her ALT is elevated at 83.  Her alk phos is e

levated at 213.  Her HCG is negative.  Her ETOH is 0.05.


08/31/21 17:21


I talked to her about her drinking.  She does admit to drinking and she said it 

was only on weekends.  She later admitted it was more frequent than that.  She 

said she notices she does have seizures more when she drinks.  She is still 

shaking some.  I will give her some ativan 1mg IV.  I will discharge her home.





Departure





- Departure


Time of Disposition: 17:25


Disposition: Home, Self-Care 01


Condition: Good


Clinical Impression: 


 Seizure





Alcohol withdrawal


Qualifiers:


 Complication of substance-induced condition: uncomplicated Qualified Code(s): 

F10.230 - Alcohol dependence with withdrawal, uncomplicated








- Discharge Information


*PRESCRIPTION DRUG MONITORING PROGRAM REVIEWED*: Not Applicable


*COPY OF PRESCRIPTION DRUG MONITORING REPORT IN PATIENT TALIA: Not Applicable


Prescriptions: 


LORazepam [Ativan] 1 mg PO DAILY #18 tablet


Ondansetron [Zofran ODT] 4 mg PO Q6H PRN #20 tab.dis


 PRN Reason: Nausea\vomiting


Referrals: 


Pam Garcia PA-C [Primary Care Provider] - 1 Week


Forms:  ED Department Discharge


Additional Instructions: 


Drink plenty of water.  Try to stop drinking.  If you want to stop I have sent 

prescriptions to the pharmacy for ativan and zofran to help.  Follow up with 

Pam Garcia.  Please return if you are worse.





Sepsis Event Note (ED)





- Evaluation


Sepsis Screening Result: No Definite Risk





- Focused Exam


Vital Signs: 


                                   Vital Signs











  Temp Pulse Resp BP Pulse Ox


 


 08/31/21 15:20  97 F  129 H  16  149/121 H  100














- My Orders


Last 24 Hours: 


My Active Orders





08/31/21 15:25


Sodium Chloride 0.9% [Saline Flush]   10 ml FLUSH ASDIRECTED PRN 


Peripheral IV Insertion Adult [OM.PC] Stat 





08/31/21 15:26


Cardiac Monitoring [RC] .AS DIRECTED 


Peripheral IV Care [RC] .AS DIRECTED 





08/31/21 15:30


Sodium Chloride 0.9% [Normal Saline] 1,000 ml IV .BOLUS 





08/31/21 17:20


LORazepam [Ativan]   1 mg IVPUSH ONETIME ONE 














- Assessment/Plan


Last 24 Hours: 


My Active Orders





08/31/21 15:25


Sodium Chloride 0.9% [Saline Flush]   10 ml FLUSH ASDIRECTED PRN 


Peripheral IV Insertion Adult [OM.PC] Stat 





08/31/21 15:26


Cardiac Monitoring [RC] .AS DIRECTED 


Peripheral IV Care [RC] .AS DIRECTED 





08/31/21 15:30


Sodium Chloride 0.9% [Normal Saline] 1,000 ml IV .BOLUS 





08/31/21 17:20


LORazepam [Ativan]   1 mg IVPUSH ONETIME ONE

## 2021-10-25 ENCOUNTER — HOSPITAL ENCOUNTER (EMERGENCY)
Dept: HOSPITAL 41 - JD.ED | Age: 31
Discharge: HOME | End: 2021-10-25
Payer: SELF-PAY

## 2021-10-25 VITALS — HEART RATE: 120 BPM | SYSTOLIC BLOOD PRESSURE: 121 MMHG | DIASTOLIC BLOOD PRESSURE: 104 MMHG

## 2021-10-25 DIAGNOSIS — R56.9: Primary | ICD-10-CM

## 2021-10-25 PROCEDURE — 81001 URINALYSIS AUTO W/SCOPE: CPT

## 2021-10-25 PROCEDURE — 96365 THER/PROPH/DIAG IV INF INIT: CPT

## 2021-10-25 PROCEDURE — 99284 EMERGENCY DEPT VISIT MOD MDM: CPT

## 2021-10-25 PROCEDURE — 36415 COLL VENOUS BLD VENIPUNCTURE: CPT

## 2021-10-25 PROCEDURE — 81025 URINE PREGNANCY TEST: CPT

## 2021-10-25 PROCEDURE — 85025 COMPLETE CBC W/AUTO DIFF WBC: CPT

## 2021-10-25 PROCEDURE — 80053 COMPREHEN METABOLIC PANEL: CPT

## 2021-10-25 NOTE — EDM.PDOC
ED HPI GENERAL MEDICAL PROBLEM





- General


Chief Complaint: Neurological Problem


Stated Complaint: [facility]


Time Seen by Provider: 10/25/21 11:06


Source of Information: Reports: Patient, RN Notes Reviewed


History Limitations: Reports: No Limitations





- History of Present Illness


INITIAL COMMENTS - FREE TEXT/NARRATIVE: 


Patient is a 31-year-old female presenting to the emergency department via 

[facility] after experiencing seizure.  Patient has a known seizure disorder 

and reports that last week she stopped taking her Keppra because it made her 

feel "dizzy ".  She did not consult with her neurologist.  Reports at this time 

she is feeling well.  Denies any headache, dizziness, or vision changes.  She is

unsure how long the seizure lasted and EMS did not provide this information.  

She reports that her seizure Ryan medication is Keppra, however she is unsure

of her dose.  She does states that she did not sleep well last evening and she 

did consume alcohol yesterday.  She thinks this is likely why she had a seizure.








- Related Data


                                    Allergies











Allergy/AdvReac Type Severity Reaction Status Date / Time


 


No Known Allergies Allergy   Verified 08/31/21 15:23











Home Meds: 


                                    Home Meds





. [No Known Home Meds]  10/25/21 [History]











Past Medical History





- Past Health History


Medical/Surgical History: Denies Medical/Surgical History


Neurological History: Reports: Seizure





Social & Family History





- Family History


Family Medical History: No Pertinent Family History





- Tobacco Use


Tobacco Use Status *Q: Never Tobacco User


Second Hand Smoke Exposure: No





- Caffeine Use


Caffeine Use: Reports: None





- Recreational Drug Use


Recreational Drug Use: No





ED ROS GENERAL





- Review of Systems


Review Of Systems: See Below


Constitutional: Reports: No Symptoms.  Denies: Fever, Chills


HEENT: Reports: No Symptoms.  Denies: Vision Change


Respiratory: Reports: No Symptoms.  Denies: Shortness of Breath


Cardiovascular: Reports: No Symptoms.  Denies: Chest Pain


Endocrine: Reports: No Symptoms


GI/Abdominal: Reports: No Symptoms


: Reports: No Symptoms


Musculoskeletal: Reports: No Symptoms


Skin: Reports: No Symptoms


Neurological: Reports: Seizure.  Denies: Confusion, Dizziness


Psychiatric: Reports: No Symptoms


Hematologic/Lymphatic: Reports: No Symptoms


Immunologic: Reports: No Symptoms





- Physical Exam


Exam: See Below


Exam Limited By: No Limitations


General Appearance: Alert, WD/WN, No Apparent Distress


Eye Exam: Bilateral Eye: Normal Inspection, PERRL


Head Exam: Atraumatic, Normocephalic


Neck: Normal Inspection, Supple, Non-Tender, Full Range of Motion


Respiratory/Chest: No Respiratory Distress, Lungs Clear, Normal Breath Sounds, 

No Accessory Muscle Use, Chest Non-Tender


Cardiovascular: Normal Peripheral Pulses, Regular Rate, Rhythm, No Edema, No 

Gallop, No JVD, No Murmur, No Rub


GI/Abdominal: Normal Bowel Sounds, Soft, Non-Tender, No Organomegaly, No 

Distention, No Abnormal Bruit, No Mass


Neuro Exam (Abbreviated): Alert, Oriented, CN II-XII Intact, Normal Cognition, 

Normal Gait, Normal Reflexes, No Motor/Sensory Deficits


Psychiatric: Normal Affect, Normal Mood


Skin Exam: Warm ( 1G), Dry, Intact, Normal Color, No Rash





Course





- Vital Signs


Last Recorded V/S: 


                                Last Vital Signs











Temp  97.8 F   10/25/21 09:32


 


Pulse  120 H  10/25/21 09:32


 


Resp  16   10/25/21 09:32


 


BP  121/104 H  10/25/21 09:32


 


Pulse Ox  100   10/25/21 09:32














- Orders/Labs/Meds


Orders: 


                               Active Orders 24 hr











 Category Date Time Status


 


 UA W/MICROSCOPIC [URIN] Stat Lab  10/25/21 12:40 Results


 


 Sodium Chloride 0.9% [Normal Saline] 1,000 ml Med  10/25/21 11:17 Active





 IV NOW   








                                Medication Orders





Sodium Chloride (Normal Saline)  1,000 mls @ 150 mls/hr IV NOW STA


   Stop: 10/25/21 17:56


   Last Admin: 10/25/21 11:32  Dose: 150 mls/hr


   Documented by: JENNA








Labs: 


                                Laboratory Tests











  10/25/21 10/25/21 10/25/21 Range/Units





  11:30 11:30 12:40 


 


WBC  6.51    (3.98-10.04)  K/mm3


 


RBC  3.55 L    (3.98-5.22)  M/mm3


 


Hgb  9.2 L    (11.2-15.7)  gm/dl


 


Hct  30.2 L    (34.1-44.9)  %


 


MCV  85.1    (79.4-94.8)  fl


 


MCH  25.9    (25.6-32.2)  pg


 


MCHC  30.5 L    (32.2-35.5)  g/dl


 


RDW Std Deviation  80.0 H    (36.4-46.3)  fL


 


Plt Count  266  D    (182-369)  K/mm3


 


MPV  8.1 L    (9.4-12.3)  fl


 


Neut % (Auto)  86.0 H    (34.0-71.1)  %


 


Lymph % (Auto)  5.4 L    (19.3-51.7)  %


 


Mono % (Auto)  8.0    (4.7-12.5)  %


 


Eos % (Auto)  0 L    (0.7-5.8)  


 


Baso % (Auto)  0.6    (0.1-1.2)  %


 


Neut # (Auto)  5.60    (1.56-6.13)  K/mm3


 


Lymph # (Auto)  0.35 L    (1.18-3.74)  K/mm3


 


Mono # (Auto)  0.52 H    (0.24-0.36)  K/mm3


 


Eos # (Auto)  0.00 L    (0.04-0.36)  K/mm3


 


Baso # (Auto)  0.04    (0.01-0.08)  K/mm3


 


Sodium   139   (136-145)  mEq/L


 


Potassium   3.4 L   (3.5-5.1)  mEq/L


 


Chloride   100   ()  mEq/L


 


Carbon Dioxide   27  D   (21-32)  mEq/L


 


Anion Gap   15.4 H   (5-15)  


 


BUN   4 L   (7-18)  mg/dL


 


Creatinine   0.7   (0.55-1.02)  mg/dL


 


Est Cr Clr Drug Dosing   TNP   


 


Estimated GFR (MDRD)   > 60   (>60)  mL/min


 


BUN/Creatinine Ratio   5.7 L   (14-18)  


 


Glucose   91   (70-99)  mg/dL


 


Calcium   8.8   (8.5-10.1)  mg/dL


 


Total Bilirubin   0.9   (0.2-1.0)  mg/dL


 


AST   148 H   (15-37)  U/L


 


ALT   89 H   (14-59)  U/L


 


Alkaline Phosphatase   184 H   ()  U/L


 


Total Protein   8.4 H   (6.4-8.2)  g/dl


 


Albumin   3.4   (3.4-5.0)  g/dl


 


Globulin   5.0   gm/dL


 


Albumin/Globulin Ratio   0.7 L   (1-2)  


 


Urine Color    Dark yellow  (Yellow)  


 


Urine Appearance    Clear  (Clear)  


 


Urine pH    6.5  (5.0-8.0)  


 


Ur Specific Gravity    > or = 1.030  (1.005-1.030)  


 


Urine Protein    2+ H  (Negative)  


 


Urine Glucose (UA)    Negative  (Negative)  


 


Urine Ketones    Trace H  (Negative)  


 


Urine Occult Blood    Negative  (Negative)  


 


Urine Nitrite    Negative  (Negative)  


 


Urine Bilirubin    Negative  (Negative)  


 


Urine Urobilinogen    0.2  (0.2-1.0)  


 


Ur Leukocyte Esterase    Negative  (Negative)  


 


Urine HCG, Qual     (NEGATIVE)  














  10/25/21 Range/Units





  12:40 


 


WBC   (3.98-10.04)  K/mm3


 


RBC   (3.98-5.22)  M/mm3


 


Hgb   (11.2-15.7)  gm/dl


 


Hct   (34.1-44.9)  %


 


MCV   (79.4-94.8)  fl


 


MCH   (25.6-32.2)  pg


 


MCHC   (32.2-35.5)  g/dl


 


RDW Std Deviation   (36.4-46.3)  fL


 


Plt Count   (182-369)  K/mm3


 


MPV   (9.4-12.3)  fl


 


Neut % (Auto)   (34.0-71.1)  %


 


Lymph % (Auto)   (19.3-51.7)  %


 


Mono % (Auto)   (4.7-12.5)  %


 


Eos % (Auto)   (0.7-5.8)  


 


Baso % (Auto)   (0.1-1.2)  %


 


Neut # (Auto)   (1.56-6.13)  K/mm3


 


Lymph # (Auto)   (1.18-3.74)  K/mm3


 


Mono # (Auto)   (0.24-0.36)  K/mm3


 


Eos # (Auto)   (0.04-0.36)  K/mm3


 


Baso # (Auto)   (0.01-0.08)  K/mm3


 


Sodium   (136-145)  mEq/L


 


Potassium   (3.5-5.1)  mEq/L


 


Chloride   ()  mEq/L


 


Carbon Dioxide   (21-32)  mEq/L


 


Anion Gap   (5-15)  


 


BUN   (7-18)  mg/dL


 


Creatinine   (0.55-1.02)  mg/dL


 


Est Cr Clr Drug Dosing   


 


Estimated GFR (MDRD)   (>60)  mL/min


 


BUN/Creatinine Ratio   (14-18)  


 


Glucose   (70-99)  mg/dL


 


Calcium   (8.5-10.1)  mg/dL


 


Total Bilirubin   (0.2-1.0)  mg/dL


 


AST   (15-37)  U/L


 


ALT   (14-59)  U/L


 


Alkaline Phosphatase   ()  U/L


 


Total Protein   (6.4-8.2)  g/dl


 


Albumin   (3.4-5.0)  g/dl


 


Globulin   gm/dL


 


Albumin/Globulin Ratio   (1-2)  


 


Urine Color   (Yellow)  


 


Urine Appearance   (Clear)  


 


Urine pH   (5.0-8.0)  


 


Ur Specific Gravity   (1.005-1.030)  


 


Urine Protein   (Negative)  


 


Urine Glucose (UA)   (Negative)  


 


Urine Ketones   (Negative)  


 


Urine Occult Blood   (Negative)  


 


Urine Nitrite   (Negative)  


 


Urine Bilirubin   (Negative)  


 


Urine Urobilinogen   (0.2-1.0)  


 


Ur Leukocyte Esterase   (Negative)  


 


Urine HCG, Qual  Negative  (NEGATIVE)  











Meds: 


Medications











Generic Name Dose Route Start Last Admin





  Trade Name Freq  PRN Reason Stop Dose Admin


 


Sodium Chloride  1,000 mls @ 150 mls/hr  10/25/21 11:17  10/25/21 11:32





  Normal Saline  IV  10/25/21 17:56  150 mls/hr





  NOW STA   Administration














Discontinued Medications














Generic Name Dose Route Start Last Admin





  Trade Name Freq  PRN Reason Stop Dose Admin


 


Levetiracetam 1,000 mg/ Sodium  110 mls @ 400 mls/hr  10/25/21 11:17  10/25/21 

11:32





  Chloride  IV  10/25/21 11:31  400 mls/hr





  ONETIME ONE   Administration














- Re-Assessments/Exams


Free Text/Narrative Re-Assessment/Exam: 


Patient is a 31-year-old female presenting to the emergency department after 

experiencing seizure at work.  She is unsure how long the seizure lasted this 

information was not provided by EMS.  Patient does report history of seizure 

disorder and that she has stopped taking her medications about a week ago 

because he feels they are making her intermittently dizzy.  She hopes to see her

neurologist this week, however does not have an appointment.  Exam is 

unremarkable.  Spoke with her pharmacy, [facility], and was advised that she is

prescribed 1/2 of a 500 mg tablet of Keppra daily, patient states she feels 

well.  I have ordered blood work, normal saline, and Keppra 1g IV.





10/25/21 13:00


Hematology was significant for hemoglobin low at 9.2.  Otherwise unremarkable.  

Review of previous visits indicated patient has been anemic for quite some time,

however she does not seem to be aware of this.  Discussed that she should 

discuss this with her primary care provider to have an anemia work-up completed.

 She does still have Keppra at home.  She should resume taking this tomorrow.  

Follow-up with neurology.  Discharge instructions as document.





Departure





- Departure


Time of Disposition: 13:00


Disposition: Home, Self-Care 01


Condition: Good


Clinical Impression: 


 Seizure








- Discharge Information


*PRESCRIPTION DRUG MONITORING PROGRAM REVIEWED*: No


*COPY OF PRESCRIPTION DRUG MONITORING REPORT IN PATIENT TALIA: No


Instructions:  Epilepsy, Easy-to-Read


Referrals: 


Narinder Sibley MD [Ordering Only Provider] - 


Forms:  ED Department Discharge


Additional Instructions: 


Resume taking your Keppra as previously prescribed.





Follow-up with your primary care provider to discuss your low hemoglobin.





Follow-up with neurology at the next available visit.





Return to ER for any new or worsening symptoms of concern.





Sepsis Event Note (ED)





- Evaluation


Sepsis Screening Result: No Definite Risk





- Focused Exam


Vital Signs: 


                                   Vital Signs











  Temp Pulse Resp BP Pulse Ox


 


 10/25/21 09:32  97.8 F  120 H  16  121/104 H  100














- My Orders


Last 24 Hours: 


My Active Orders





10/25/21 11:17


Sodium Chloride 0.9% [Normal Saline] 1,000 ml IV NOW 





10/25/21 12:40


UA W/MICROSCOPIC [URIN] Stat 














- Assessment/Plan


Last 24 Hours: 


My Active Orders





10/25/21 11:17


Sodium Chloride 0.9% [Normal Saline] 1,000 ml IV NOW 





10/25/21 12:40


UA W/MICROSCOPIC [URIN] Stat

## 2022-02-01 ENCOUNTER — HOSPITAL ENCOUNTER (EMERGENCY)
Dept: HOSPITAL 41 - JD.ED | Age: 32
Discharge: HOME | End: 2022-02-01
Payer: MEDICAID

## 2022-02-01 VITALS — SYSTOLIC BLOOD PRESSURE: 104 MMHG | DIASTOLIC BLOOD PRESSURE: 68 MMHG | HEART RATE: 124 BPM

## 2022-02-01 DIAGNOSIS — K91.841: Primary | ICD-10-CM

## 2022-02-01 DIAGNOSIS — R44.3: ICD-10-CM

## 2022-04-30 ENCOUNTER — HOSPITAL ENCOUNTER (EMERGENCY)
Dept: HOSPITAL 41 - JD.ED | Age: 32
LOS: 1 days | Discharge: HOME | End: 2022-05-01
Payer: MEDICAID

## 2022-04-30 DIAGNOSIS — D50.9: ICD-10-CM

## 2022-04-30 DIAGNOSIS — F10.129: ICD-10-CM

## 2022-04-30 DIAGNOSIS — E87.6: Primary | ICD-10-CM

## 2022-04-30 PROCEDURE — 80053 COMPREHEN METABOLIC PANEL: CPT

## 2022-04-30 PROCEDURE — 86922 COMPATIBILITY TEST ANTIGLOB: CPT

## 2022-04-30 PROCEDURE — P9016 RBC LEUKOCYTES REDUCED: HCPCS

## 2022-04-30 PROCEDURE — 80307 DRUG TEST PRSMV CHEM ANLYZR: CPT

## 2022-04-30 PROCEDURE — 36430 TRANSFUSION BLD/BLD COMPNT: CPT

## 2022-04-30 PROCEDURE — 80306 DRUG TEST PRSMV INSTRMNT: CPT

## 2022-04-30 PROCEDURE — 36415 COLL VENOUS BLD VENIPUNCTURE: CPT

## 2022-04-30 PROCEDURE — 86900 BLOOD TYPING SEROLOGIC ABO: CPT

## 2022-04-30 PROCEDURE — 81025 URINE PREGNANCY TEST: CPT

## 2022-04-30 PROCEDURE — 81001 URINALYSIS AUTO W/SCOPE: CPT

## 2022-04-30 PROCEDURE — 86850 RBC ANTIBODY SCREEN: CPT

## 2022-04-30 PROCEDURE — 99284 EMERGENCY DEPT VISIT MOD MDM: CPT

## 2022-04-30 PROCEDURE — 85027 COMPLETE CBC AUTOMATED: CPT

## 2022-04-30 PROCEDURE — 86901 BLOOD TYPING SEROLOGIC RH(D): CPT

## 2022-04-30 PROCEDURE — 85007 BL SMEAR W/DIFF WBC COUNT: CPT

## 2022-04-30 PROCEDURE — 84443 ASSAY THYROID STIM HORMONE: CPT

## 2022-05-01 VITALS — HEART RATE: 97 BPM

## 2022-05-01 VITALS — DIASTOLIC BLOOD PRESSURE: 68 MMHG | SYSTOLIC BLOOD PRESSURE: 96 MMHG

## 2022-06-24 ENCOUNTER — HOSPITAL ENCOUNTER (EMERGENCY)
Dept: HOSPITAL 41 - JD.ED | Age: 32
Discharge: HOME | End: 2022-06-24
Payer: MEDICAID

## 2022-06-24 VITALS — HEART RATE: 104 BPM | DIASTOLIC BLOOD PRESSURE: 77 MMHG | SYSTOLIC BLOOD PRESSURE: 122 MMHG

## 2022-06-24 DIAGNOSIS — G40.409: Primary | ICD-10-CM

## 2022-06-24 LAB — EGFRCR SERPLBLD CKD-EPI 2021: 123 ML/MIN (ref 60–?)

## 2022-06-24 PROCEDURE — 80053 COMPREHEN METABOLIC PANEL: CPT

## 2022-06-24 PROCEDURE — 85025 COMPLETE CBC W/AUTO DIFF WBC: CPT

## 2022-06-24 PROCEDURE — 99285 EMERGENCY DEPT VISIT HI MDM: CPT

## 2022-06-24 PROCEDURE — 96376 TX/PRO/DX INJ SAME DRUG ADON: CPT

## 2022-06-24 PROCEDURE — 83735 ASSAY OF MAGNESIUM: CPT

## 2022-06-24 PROCEDURE — 36415 COLL VENOUS BLD VENIPUNCTURE: CPT

## 2022-06-24 PROCEDURE — 96375 TX/PRO/DX INJ NEW DRUG ADDON: CPT

## 2022-06-24 PROCEDURE — 81001 URINALYSIS AUTO W/SCOPE: CPT

## 2022-06-24 PROCEDURE — 84703 CHORIONIC GONADOTROPIN ASSAY: CPT

## 2022-06-24 PROCEDURE — 96365 THER/PROPH/DIAG IV INF INIT: CPT

## 2022-06-24 PROCEDURE — 96361 HYDRATE IV INFUSION ADD-ON: CPT

## 2023-05-27 ENCOUNTER — HOSPITAL ENCOUNTER (EMERGENCY)
Dept: HOSPITAL 41 - JD.ED | Age: 33
Discharge: HOME | End: 2023-05-27
Payer: COMMERCIAL

## 2023-05-27 VITALS — HEART RATE: 110 BPM | DIASTOLIC BLOOD PRESSURE: 104 MMHG | SYSTOLIC BLOOD PRESSURE: 125 MMHG

## 2023-05-27 DIAGNOSIS — S01.511A: ICD-10-CM

## 2023-05-27 DIAGNOSIS — R56.9: Primary | ICD-10-CM
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
## 2023-05-27 DIAGNOSIS — S01.81XA: ICD-10-CM
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
## 2023-05-27 LAB
ALBUMIN SERPL-MCNC: 4.1 G/DL (ref 3.4–5)
ALBUMIN/GLOB SERPL: 0.9 {RATIO} (ref 1–2)
ALP SERPL-CCNC: 96 U/L (ref 46–116)
ALT SERPL-CCNC: 31 U/L (ref 14–59)
ANION GAP SERPL CALC-SCNC: 21.7 MMOL/L (ref 5–15)
AST SERPL-CCNC: 41 U/L (ref 15–37)
BASOPHILS # BLD AUTO: 0.02 K/MM3 (ref 0.01–0.08)
BASOPHILS NFR BLD AUTO: 0.5 % (ref 0.1–1.2)
BILIRUB SERPL-MCNC: 1 MG/DL (ref 0.2–1)
BUN SERPL-MCNC: 7 MG/DL (ref 7–18)
BUN/CREAT SERPL: 10 (ref 14–18)
CALCIUM SERPL-MCNC: 9.1 MG/DL (ref 8.5–10.1)
CHLORIDE SERPL-SCNC: 93 MEQ/L (ref 98–107)
CO2 SERPL-SCNC: 21 MEQ/L (ref 21–32)
CREAT CL 24H UR+SERPL-VRATE: (no result) ML/MIN
CREAT SERPL-MCNC: 0.7 MG/DL (ref 0.55–1.02)
EGFRCR SERPLBLD CKD-EPI 2021: 118 ML/MIN (ref 60–?)
EOSINOPHIL # BLD AUTO: 0 K/MM3 (ref 0.04–0.36)
EOSINOPHIL NFR BLD AUTO: 0 % (ref 0.7–5.8)
ETHANOL BLD-MCNC: 0 GM%
GLOBULIN SER-MCNC: 4.7 GM/DL
GLUCOSE SERPL-MCNC: 158 MG/DL (ref 70–99)
HCT VFR BLD AUTO: 35.9 % (ref 34.1–44.9)
HGB BLD-MCNC: 11.6 GM/DL (ref 11.2–15.7)
IMM GRANULOCYTES # BLD: 0.01 K/MM3 (ref 0–0.1)
IMM GRANULOCYTES NFR BLD: 0.2 % (ref ?–1)
LYMPHOCYTES # BLD AUTO: 0.82 K/MM3 (ref 1.18–3.74)
LYMPHOCYTES NFR BLD AUTO: 18.6 % (ref 19.3–51.7)
MAGNESIUM SERPL-MCNC: 1.6 MG/DL (ref 1.8–2.4)
MCH RBC QN AUTO: 28.2 PG (ref 25.6–32.2)
MCHC RBC AUTO-ENTMCNC: 32.3 G/DL (ref 32.2–35.5)
MCHC RBC AUTO-ENTMCNC: 87.3 FL (ref 79.4–94.8)
MONOCYTES # BLD AUTO: 0.44 K/MM3 (ref 0.24–0.36)
MONOCYTES NFR BLD AUTO: 10 % (ref 4.7–12.5)
NEUTROPHILS # BLD AUTO: 3.13 K/MM3 (ref 1.56–6.13)
NEUTROPHILS NFR BLD AUTO: 70.7 % (ref 34–71.1)
PLATELET # BLD AUTO: 315 K/MM3 (ref 182–369)
PMV BLD AUTO: 8.7 FL (ref 9.4–12.3)
POTASSIUM SERPL-SCNC: 3.7 MEQ/L (ref 3.5–5.1)
PROT SERPL-MCNC: 8.8 G/DL (ref 6.4–8.2)
RBC # BLD AUTO: 4.11 M/MM3 (ref 3.98–5.22)
SODIUM SERPL-SCNC: 132 MEQ/L (ref 136–145)
WBC # BLD AUTO: 4.42 K/MM3 (ref 3.98–10.04)

## 2023-05-27 PROCEDURE — 12011 RPR F/E/E/N/L/M 2.5 CM/<: CPT

## 2023-05-27 PROCEDURE — 80053 COMPREHEN METABOLIC PANEL: CPT

## 2023-05-27 PROCEDURE — 70450 CT HEAD/BRAIN W/O DYE: CPT

## 2023-05-27 PROCEDURE — 36415 COLL VENOUS BLD VENIPUNCTURE: CPT

## 2023-05-27 PROCEDURE — 80307 DRUG TEST PRSMV CHEM ANLYZR: CPT

## 2023-05-27 PROCEDURE — 83735 ASSAY OF MAGNESIUM: CPT

## 2023-05-27 PROCEDURE — 96375 TX/PRO/DX INJ NEW DRUG ADDON: CPT

## 2023-05-27 PROCEDURE — 84703 CHORIONIC GONADOTROPIN ASSAY: CPT

## 2023-05-27 PROCEDURE — 96365 THER/PROPH/DIAG IV INF INIT: CPT

## 2023-05-27 PROCEDURE — 99285 EMERGENCY DEPT VISIT HI MDM: CPT

## 2023-05-27 PROCEDURE — 85025 COMPLETE CBC W/AUTO DIFF WBC: CPT

## 2024-05-22 NOTE — EDM.PDOC
ED HPI GENERAL MEDICAL PROBLEM





- General


Chief Complaint: Neurological Problem


Stated Complaint: [facility]


Time Seen by Provider: 03/09/20 13:16


Source of Information: Reports: Patient, EMS, RN Notes Reviewed





- History of Present Illness


INITIAL COMMENTS - FREE TEXT/NARRATIVE: 





30-year-old female has been brought in by EMS after syncopal event versus 

possible seizure.  Reported to have been walking across the street and did fall 

to the ground.  A friend then did find her on the street and described to EMS 

that there was some "shaking but did not give a description of definite tonic-

clonic seizure activity.  Upon EMS arrival patient was awake no major distress.

  She did not or could not give them description of how she felt before this 

happened.  On arrival to ED patient has no complaints whatsoever but still does 

not really remember what happened.  Does not recall feeling weak dizzy 

lightheaded prior to this happening.  She is reported to have have had some 

possible mild confusion upon EMS arrival but there is no evidence of that upon 

arrival to ED.





- Related Data


 Allergies











Allergy/AdvReac Type Severity Reaction Status Date / Time


 


No Known Allergies Allergy   Verified 03/09/20 13:21











Home Meds: 


 Home Meds





. [No Known Home Meds]  04/24/17 [History]











Past Medical History





- Past Health History


Medical/Surgical History: Denies Medical/Surgical History





Social & Family History





- Family History


Family Medical History: Noncontributory





- Tobacco Use


Smoking Status *Q: Never Smoker


Second Hand Smoke Exposure: No





- Caffeine Use


Caffeine Use: Reports: None





- Recreational Drug Use


Recreational Drug Use: No





ED ROS GENERAL





- Review of Systems


Review Of Systems: See Below


Constitutional: Denies: Fever, Chills


HEENT: Denies: Ear Discharge, Sinus Problem, Throat Pain, Vertigo, Vision Change


Respiratory: Denies: Shortness of Breath


Cardiovascular: Denies: Chest Pain


GI/Abdominal: Denies: Abdominal Pain, Nausea, Vomiting


Musculoskeletal: Denies: Neck Pain, Shoulder Pain, Back Pain, Joint Pain


Skin: Denies: Rash


Neurological: Reports: Dizziness (now better).  Denies: Headache, Numbness, 

Tingling, Trouble Speaking, Weakness





ED EXAM, NEURO





- Physical Exam


Exam: See Below


General Appearance: Alert, No Apparent Distress (at time of exam)


Eye Exam: Bilateral Eye: PERRL


Ears: Normal External Exam


Nose: Normal Inspection


Throat/Mouth: Normal Inspection, Normal Oropharynx


Head Exam: Atraumatic


Neck: Supple, Full Range of Motion


Respiratory/Chest: No Respiratory Distress, Lungs Clear, Normal Breath Sounds


Cardiovascular: No: Tachycardia (101)


GI/Abdominal: Soft, Non-Tender


Neurological: Alert, No Motor/Sensory Deficits


Back Exam: No: CVA Tenderness (L), CVA Tenderness (R)


Extremities: Normal Inspection, Normal Range of Motion


Psychiatric: Normal Affect, Normal Mood


Skin Exam: Warm, Dry





Course





- Vital Signs


Last Recorded V/S: 


 Last Vital Signs











Temp  97 F   03/09/20 13:15


 


Pulse  94   03/09/20 16:00


 


Resp  20   03/09/20 16:00


 


BP  143/87 H  03/09/20 16:00


 


Pulse Ox  98   03/09/20 16:00














- Orders/Labs/Meds


Labs: 


 Laboratory Tests











  03/09/20 03/09/20 03/09/20 Range/Units





  13:50 13:50 13:50 


 


WBC  4.59    (3.98-10.04)  K/mm3


 


RBC  3.88 L    (3.98-5.22)  M/mm3


 


Hgb  10.8 L    (11.2-15.7)  gm/dl


 


Hct  34.6    (34.1-44.9)  %


 


MCV  89.2  D    (79.4-94.8)  fl


 


MCH  27.8    (25.6-32.2)  pg


 


MCHC  31.2 L    (32.2-35.5)  g/dl


 


RDW Std Deviation  66.0 H    (36.4-46.3)  fL


 


Plt Count  268    (182-369)  K/mm3


 


MPV  8.7 L    (9.4-12.3)  fl


 


Neut % (Auto)  77.6 H    (34.0-71.1)  %


 


Lymph % (Auto)  12.2 L    (19.3-51.7)  %


 


Mono % (Auto)  8.5    (4.7-12.5)  %


 


Eos % (Auto)  0.4 L    (0.7-5.8)  


 


Baso % (Auto)  1.1    (0.1-1.2)  %


 


Neut # (Auto)  3.56    (1.56-6.13)  K/mm3


 


Lymph # (Auto)  0.56 L    (1.18-3.74)  K/mm3


 


Mono # (Auto)  0.39 H    (0.24-0.36)  K/mm3


 


Eos # (Auto)  0.02 L    (0.04-0.36)  K/mm3


 


Baso # (Auto)  0.05    (0.01-0.08)  K/mm3


 


Sodium   136   (136-145)  mEq/L


 


Potassium   3.5   (3.5-5.1)  mEq/L


 


Chloride   101   ()  mEq/L


 


Carbon Dioxide   21   (21-32)  mEq/L


 


Anion Gap   17.5 H   (5-15)  


 


BUN   5 L   (7-18)  mg/dL


 


Creatinine   0.7   (0.55-1.02)  mg/dL


 


Est Cr Clr Drug Dosing   TNP   


 


Estimated GFR (MDRD)   > 60   (>60)  mL/min


 


BUN/Creatinine Ratio   7.1 L   (14-18)  


 


Glucose   154 H   ()  mg/dL


 


Lactic Acid    3.9 H*  (0.4-2.0)  mmol/L


 


Calcium   8.7   (8.5-10.1)  mg/dL


 


Total Bilirubin   1.0   (0.2-1.0)  mg/dL


 


AST   128 H   (15-37)  U/L


 


ALT   62 H   (14-59)  U/L


 


Alkaline Phosphatase   127 H   ()  U/L


 


Total Protein   8.5 H   (6.4-8.2)  g/dl


 


Albumin   3.2 L   (3.4-5.0)  g/dl


 


Globulin   5.3   gm/dL


 


Albumin/Globulin Ratio   0.6 L   (1-2)  


 


Urine Color     (Yellow)  


 


Urine Appearance     (Clear)  


 


Urine pH     (5.0-8.0)  


 


Ur Specific Gravity     (1.005-1.030)  


 


Urine Protein     (Negative)  


 


Urine Glucose (UA)     (Negative)  


 


Urine Ketones     (Negative)  


 


Urine Occult Blood     (Negative)  


 


Urine Nitrite     (Negative)  


 


Urine Bilirubin     (Negative)  


 


Urine Urobilinogen     (0.2-1.0)  


 


Ur Leukocyte Esterase     (Negative)  


 


Urine RBC     (0-5)  /hpf


 


Urine WBC     (0-5)  /hpf


 


Ur Squamous Epith Cells     (0-5)  /hpf


 


Urine Bacteria     (FEW)  /hpf


 


Urine Mucus     (FEW)  /hpf


 


Urine HCG, Qual     (NEGATIVE)  


 


Urine Opiates Screen     (KZVJGT=969)  


 


Ur Buprenorphine Scrn     (CUTOFF=10)  


 


Ur Oxycodone Screen     (BLK8XB=801)  


 


Urine Methadone Screen     (NNFFRQ=469)  


 


Ur Propoxyphene Screen     (NHPOVP=582)  


 


Ur Barbiturates Screen     (CPVMCE=528)  


 


Ur Tricyclics Screen     (HBWBNV=501)  


 


Ur Phencyclidine Scrn     (CUTOFF=25)  


 


Ur Amphetamine Screen     (ZBFODL=888)  


 


U Methamphetamines Scrn     (HXDKIM=172)  


 


U Benzodiazepines Scrn     (SLVSYJ=424)  


 


U Cocaine Metab Screen     (EINJCW=229)  


 


U Marijuana (THC) Screen     (CUTOFF=50)  


 


Ethyl Alcohol     (0.00)  gm%














  03/09/20 03/09/20 03/09/20 Range/Units





  13:50 13:50 15:18 


 


WBC     (3.98-10.04)  K/mm3


 


RBC     (3.98-5.22)  M/mm3


 


Hgb     (11.2-15.7)  gm/dl


 


Hct     (34.1-44.9)  %


 


MCV     (79.4-94.8)  fl


 


MCH     (25.6-32.2)  pg


 


MCHC     (32.2-35.5)  g/dl


 


RDW Std Deviation     (36.4-46.3)  fL


 


Plt Count     (182-369)  K/mm3


 


MPV     (9.4-12.3)  fl


 


Neut % (Auto)     (34.0-71.1)  %


 


Lymph % (Auto)     (19.3-51.7)  %


 


Mono % (Auto)     (4.7-12.5)  %


 


Eos % (Auto)     (0.7-5.8)  


 


Baso % (Auto)     (0.1-1.2)  %


 


Neut # (Auto)     (1.56-6.13)  K/mm3


 


Lymph # (Auto)     (1.18-3.74)  K/mm3


 


Mono # (Auto)     (0.24-0.36)  K/mm3


 


Eos # (Auto)     (0.04-0.36)  K/mm3


 


Baso # (Auto)     (0.01-0.08)  K/mm3


 


Sodium     (136-145)  mEq/L


 


Potassium     (3.5-5.1)  mEq/L


 


Chloride     ()  mEq/L


 


Carbon Dioxide     (21-32)  mEq/L


 


Anion Gap     (5-15)  


 


BUN     (7-18)  mg/dL


 


Creatinine     (0.55-1.02)  mg/dL


 


Est Cr Clr Drug Dosing     


 


Estimated GFR (MDRD)     (>60)  mL/min


 


BUN/Creatinine Ratio     (14-18)  


 


Glucose     ()  mg/dL


 


Lactic Acid     (0.4-2.0)  mmol/L


 


Calcium     (8.5-10.1)  mg/dL


 


Total Bilirubin     (0.2-1.0)  mg/dL


 


AST     (15-37)  U/L


 


ALT     (14-59)  U/L


 


Alkaline Phosphatase     ()  U/L


 


Total Protein     (6.4-8.2)  g/dl


 


Albumin     (3.4-5.0)  g/dl


 


Globulin     gm/dL


 


Albumin/Globulin Ratio     (1-2)  


 


Urine Color     (Yellow)  


 


Urine Appearance     (Clear)  


 


Urine pH     (5.0-8.0)  


 


Ur Specific Gravity     (1.005-1.030)  


 


Urine Protein     (Negative)  


 


Urine Glucose (UA)     (Negative)  


 


Urine Ketones     (Negative)  


 


Urine Occult Blood     (Negative)  


 


Urine Nitrite     (Negative)  


 


Urine Bilirubin     (Negative)  


 


Urine Urobilinogen     (0.2-1.0)  


 


Ur Leukocyte Esterase     (Negative)  


 


Urine RBC     (0-5)  /hpf


 


Urine WBC     (0-5)  /hpf


 


Ur Squamous Epith Cells     (0-5)  /hpf


 


Urine Bacteria     (FEW)  /hpf


 


Urine Mucus     (FEW)  /hpf


 


Urine HCG, Qual   Negative   (NEGATIVE)  


 


Urine Opiates Screen    Negative  (JZIEZS=129)  


 


Ur Buprenorphine Scrn    Negative  (CUTOFF=10)  


 


Ur Oxycodone Screen    Negative  (JYV3VE=738)  


 


Urine Methadone Screen    Negative  (SNAAJF=565)  


 


Ur Propoxyphene Screen    Negative  (YWXVEW=917)  


 


Ur Barbiturates Screen    Negative  (KMGUBJ=245)  


 


Ur Tricyclics Screen    Negative  (BAFWXL=839)  


 


Ur Phencyclidine Scrn    Negative  (CUTOFF=25)  


 


Ur Amphetamine Screen    Negative  (MMFSIT=975)  


 


U Methamphetamines Scrn    Negative  (RPAEBF=136)  


 


U Benzodiazepines Scrn    Negative  (PUGIYH=312)  


 


U Cocaine Metab Screen    Negative  (EWRZDN=021)  


 


U Marijuana (THC) Screen    Negative  (CUTOFF=50)  


 


Ethyl Alcohol  0.00    (0.00)  gm%














  03/09/20 Range/Units





  15:18 


 


WBC   (3.98-10.04)  K/mm3


 


RBC   (3.98-5.22)  M/mm3


 


Hgb   (11.2-15.7)  gm/dl


 


Hct   (34.1-44.9)  %


 


MCV   (79.4-94.8)  fl


 


MCH   (25.6-32.2)  pg


 


MCHC   (32.2-35.5)  g/dl


 


RDW Std Deviation   (36.4-46.3)  fL


 


Plt Count   (182-369)  K/mm3


 


MPV   (9.4-12.3)  fl


 


Neut % (Auto)   (34.0-71.1)  %


 


Lymph % (Auto)   (19.3-51.7)  %


 


Mono % (Auto)   (4.7-12.5)  %


 


Eos % (Auto)   (0.7-5.8)  


 


Baso % (Auto)   (0.1-1.2)  %


 


Neut # (Auto)   (1.56-6.13)  K/mm3


 


Lymph # (Auto)   (1.18-3.74)  K/mm3


 


Mono # (Auto)   (0.24-0.36)  K/mm3


 


Eos # (Auto)   (0.04-0.36)  K/mm3


 


Baso # (Auto)   (0.01-0.08)  K/mm3


 


Sodium   (136-145)  mEq/L


 


Potassium   (3.5-5.1)  mEq/L


 


Chloride   ()  mEq/L


 


Carbon Dioxide   (21-32)  mEq/L


 


Anion Gap   (5-15)  


 


BUN   (7-18)  mg/dL


 


Creatinine   (0.55-1.02)  mg/dL


 


Est Cr Clr Drug Dosing   


 


Estimated GFR (MDRD)   (>60)  mL/min


 


BUN/Creatinine Ratio   (14-18)  


 


Glucose   ()  mg/dL


 


Lactic Acid   (0.4-2.0)  mmol/L


 


Calcium   (8.5-10.1)  mg/dL


 


Total Bilirubin   (0.2-1.0)  mg/dL


 


AST   (15-37)  U/L


 


ALT   (14-59)  U/L


 


Alkaline Phosphatase   ()  U/L


 


Total Protein   (6.4-8.2)  g/dl


 


Albumin   (3.4-5.0)  g/dl


 


Globulin   gm/dL


 


Albumin/Globulin Ratio   (1-2)  


 


Urine Color  Yellow  (Yellow)  


 


Urine Appearance  Clear  (Clear)  


 


Urine pH  7.0  (5.0-8.0)  


 


Ur Specific Gravity  > or = 1.030  (1.005-1.030)  


 


Urine Protein  2+ H  (Negative)  


 


Urine Glucose (UA)  Negative  (Negative)  


 


Urine Ketones  Negative  (Negative)  


 


Urine Occult Blood  Negative  (Negative)  


 


Urine Nitrite  Negative  (Negative)  


 


Urine Bilirubin  Negative  (Negative)  


 


Urine Urobilinogen  1.0  (0.2-1.0)  


 


Ur Leukocyte Esterase  Negative  (Negative)  


 


Urine RBC  0-5  (0-5)  /hpf


 


Urine WBC  0-5  (0-5)  /hpf


 


Ur Squamous Epith Cells  0-5  (0-5)  /hpf


 


Urine Bacteria  Few  (FEW)  /hpf


 


Urine Mucus  Few  (FEW)  /hpf


 


Urine HCG, Qual   (NEGATIVE)  


 


Urine Opiates Screen   (WWXCBE=137)  


 


Ur Buprenorphine Scrn   (CUTOFF=10)  


 


Ur Oxycodone Screen   (NKR1DG=530)  


 


Urine Methadone Screen   (TRLGRG=354)  


 


Ur Propoxyphene Screen   (EQYBRO=701)  


 


Ur Barbiturates Screen   (TCKYYN=504)  


 


Ur Tricyclics Screen   (JAVCKU=922)  


 


Ur Phencyclidine Scrn   (CUTOFF=25)  


 


Ur Amphetamine Screen   (ZMPTCG=173)  


 


U Methamphetamines Scrn   (QMKEAN=667)  


 


U Benzodiazepines Scrn   (IHPAQG=276)  


 


U Cocaine Metab Screen   (LZMFFJ=008)  


 


U Marijuana (THC) Screen   (CUTOFF=50)  


 


Ethyl Alcohol   (0.00)  gm%











Meds: 


Medications














Discontinued Medications














Generic Name Dose Route Start Last Admin





  Trade Name Freq  PRN Reason Stop Dose Admin


 


Sodium Chloride  10 ml  03/09/20 13:24  03/09/20 14:19





  Saline Flush  FLUSH   10 ml





  ASDIRECTED PRN   Administration





  Keep Vein Open   





     





     





     














- Re-Assessments/Exams


Free Text/Narrative Re-Assessment/Exam: 





03/09/20 15:37


Head CT normal, lactic acid did come back elevated at 3.9.  Labs relatively 

normal.  This was checked not because of sepsis concern but to better 

differentiate between simple syncope or possible seizure.  The elevated lactic 

acid strongly suggests that this was a seizure.  Kiah with her the importance 

of not driving until further medical work-up, medical clearance to do so.  

Discharge instructions as documented.











Departure





- Departure


Time of Disposition: 15:38


Disposition: Home, Self-Care 01


Condition: Fair


Clinical Impression: 


 Generalized seizure








- Discharge Information


Instructions:  Seizure, Adult, Easy-to-Read


Referrals: 


PCP,None [Primary Care Provider] - 


Forms:  ED Department Discharge, ED Return to Work/School Form


Additional Instructions: 


Your history of what happened today and laboratory findings suggest that this 

was a generalized seizure that you experienced today.  Because of that you 

cannot drive until medically cleared to do so both for your safety and for the 

safety of other people on our streets, roads and highways.  Follow-up with ABHI Medrano at our [facility]  medical clinic later this week or early next week.  

Call [phone] for appointment.  Return to ED as needed.





Sepsis Event Note





- Evaluation


Sepsis Screening Result: No Definite Risk





- Focused Exam


Date Exam was Performed: 03/12/20


Time Exam was Performed: 17:30 FORM: Advance Directive    Left By: Patient- Marielos Price    Instructions: Please scan into chart    To Be Returned By: N/A    For Further Information, the patient may be contacted at: [phone]    Placed in MD mailbox- Dr. Mclean